# Patient Record
Sex: MALE | Race: WHITE | NOT HISPANIC OR LATINO | Employment: UNEMPLOYED | URBAN - METROPOLITAN AREA
[De-identification: names, ages, dates, MRNs, and addresses within clinical notes are randomized per-mention and may not be internally consistent; named-entity substitution may affect disease eponyms.]

---

## 2017-02-14 ENCOUNTER — HOSPITAL ENCOUNTER (EMERGENCY)
Facility: HOSPITAL | Age: 22
Discharge: HOME | End: 2017-02-14
Attending: EMERGENCY MEDICINE | Admitting: EMERGENCY MEDICINE

## 2017-02-14 ENCOUNTER — APPOINTMENT (OUTPATIENT)
Dept: GENERAL RADIOLOGY | Facility: HOSPITAL | Age: 22
End: 2017-02-14

## 2017-02-14 VITALS
OXYGEN SATURATION: 98 % | HEART RATE: 104 BPM | RESPIRATION RATE: 14 BRPM | BODY MASS INDEX: 18.61 KG/M2 | TEMPERATURE: 97.5 F | WEIGHT: 145 LBS | DIASTOLIC BLOOD PRESSURE: 95 MMHG | HEIGHT: 74 IN | SYSTOLIC BLOOD PRESSURE: 142 MMHG

## 2017-02-14 DIAGNOSIS — F15.10 METHAMPHETAMINE ABUSE (HCC): ICD-10-CM

## 2017-02-14 DIAGNOSIS — R09.1 PLEURISY: Primary | ICD-10-CM

## 2017-02-14 DIAGNOSIS — Z78.9 CAFFEINE USE: ICD-10-CM

## 2017-02-14 LAB
ALBUMIN SERPL-MCNC: 4.4 G/DL (ref 3.5–5.2)
ALBUMIN/GLOB SERPL: 1.7 G/DL
ALP SERPL-CCNC: 86 U/L (ref 40–129)
ALT SERPL W P-5'-P-CCNC: 17 U/L (ref 5–41)
AMPHET+METHAMPHET UR QL: POSITIVE
AMPHETAMINES UR QL: POSITIVE
ANION GAP SERPL CALCULATED.3IONS-SCNC: 11.6 MMOL/L
AST SERPL-CCNC: 22 U/L (ref 5–40)
BARBITURATES UR QL SCN: NEGATIVE
BASOPHILS # BLD AUTO: 0.04 10*3/MM3 (ref 0–0.2)
BASOPHILS NFR BLD AUTO: 0.2 % (ref 0–2)
BENZODIAZ UR QL SCN: NEGATIVE
BILIRUB SERPL-MCNC: 0.3 MG/DL (ref 0.2–1.2)
BUN BLD-MCNC: 15 MG/DL (ref 6–20)
BUN/CREAT SERPL: 16.7 (ref 7–25)
BUPRENORPHINE SERPL-MCNC: NEGATIVE NG/ML
CALCIUM SPEC-SCNC: 9.5 MG/DL (ref 8.6–10.5)
CANNABINOIDS SERPL QL: NEGATIVE
CHLORIDE SERPL-SCNC: 98 MMOL/L (ref 98–107)
CO2 SERPL-SCNC: 24.4 MMOL/L (ref 22–29)
COCAINE UR QL: NEGATIVE
CREAT BLD-MCNC: 0.9 MG/DL (ref 0.76–1.27)
D DIMER PPP FEU-MCNC: <0.3 MCGFEU/ML (ref 0–0.46)
DEPRECATED RDW RBC AUTO: 40.5 FL (ref 37–54)
EOSINOPHIL # BLD AUTO: 0.06 10*3/MM3 (ref 0.1–0.3)
EOSINOPHIL NFR BLD AUTO: 0.4 % (ref 0–4)
ERYTHROCYTE [DISTWIDTH] IN BLOOD BY AUTOMATED COUNT: 12.7 % (ref 11.5–14.5)
GFR SERPL CREATININE-BSD FRML MDRD: 106 ML/MIN/1.73
GLOBULIN UR ELPH-MCNC: 2.6 GM/DL
GLUCOSE BLD-MCNC: 163 MG/DL (ref 65–99)
HCT VFR BLD AUTO: 41.3 % (ref 42–52)
HGB BLD-MCNC: 13.8 G/DL (ref 14–18)
IMM GRANULOCYTES # BLD: 0.08 10*3/MM3 (ref 0–0.03)
IMM GRANULOCYTES NFR BLD: 0.5 % (ref 0–0.5)
LYMPHOCYTES # BLD AUTO: 1.36 10*3/MM3 (ref 0.6–4.8)
LYMPHOCYTES NFR BLD AUTO: 8 % (ref 20–45)
MCH RBC QN AUTO: 29 PG (ref 27–31)
MCHC RBC AUTO-ENTMCNC: 33.4 G/DL (ref 31–37)
MCV RBC AUTO: 86.8 FL (ref 80–94)
METHADONE UR QL SCN: NEGATIVE
MONOCYTES # BLD AUTO: 1.27 10*3/MM3 (ref 0–1)
MONOCYTES NFR BLD AUTO: 7.5 % (ref 3–8)
NEUTROPHILS # BLD AUTO: 14.09 10*3/MM3 (ref 1.5–8.3)
NEUTROPHILS NFR BLD AUTO: 83.4 % (ref 45–70)
NRBC BLD MANUAL-RTO: 0 /100 WBC (ref 0–0)
OPIATES UR QL: NEGATIVE
OXYCODONE UR QL SCN: NEGATIVE
PCP UR QL SCN: NEGATIVE
PLATELET # BLD AUTO: 299 10*3/MM3 (ref 140–500)
PMV BLD AUTO: 8.9 FL (ref 7.4–10.4)
POTASSIUM BLD-SCNC: 4.1 MMOL/L (ref 3.5–5.2)
PROPOXYPH UR QL: NEGATIVE
PROT SERPL-MCNC: 7 G/DL (ref 6–8.5)
RBC # BLD AUTO: 4.76 10*6/MM3 (ref 4.7–6.1)
SODIUM BLD-SCNC: 134 MMOL/L (ref 136–145)
TRICYCLICS UR QL SCN: NEGATIVE
TSH SERPL DL<=0.05 MIU/L-ACNC: 2.34 MIU/ML (ref 0.27–4.2)
WBC NRBC COR # BLD: 16.9 10*3/MM3 (ref 4.8–10.8)

## 2017-02-14 PROCEDURE — 80050 GENERAL HEALTH PANEL: CPT | Performed by: EMERGENCY MEDICINE

## 2017-02-14 PROCEDURE — 80053 COMPREHEN METABOLIC PANEL: CPT

## 2017-02-14 PROCEDURE — 96374 THER/PROPH/DIAG INJ IV PUSH: CPT

## 2017-02-14 PROCEDURE — 71020 HC CHEST PA AND LATERAL: CPT

## 2017-02-14 PROCEDURE — 85379 FIBRIN DEGRADATION QUANT: CPT | Performed by: EMERGENCY MEDICINE

## 2017-02-14 PROCEDURE — 93010 ELECTROCARDIOGRAM REPORT: CPT | Performed by: INTERNAL MEDICINE

## 2017-02-14 PROCEDURE — 99284 EMERGENCY DEPT VISIT MOD MDM: CPT

## 2017-02-14 PROCEDURE — 25010000002 KETOROLAC TROMETHAMINE PER 15 MG: Performed by: EMERGENCY MEDICINE

## 2017-02-14 PROCEDURE — 80306 DRUG TEST PRSMV INSTRMNT: CPT | Performed by: EMERGENCY MEDICINE

## 2017-02-14 PROCEDURE — 93005 ELECTROCARDIOGRAM TRACING: CPT

## 2017-02-14 PROCEDURE — 84443 ASSAY THYROID STIM HORMONE: CPT

## 2017-02-14 PROCEDURE — 99284 EMERGENCY DEPT VISIT MOD MDM: CPT | Performed by: EMERGENCY MEDICINE

## 2017-02-14 PROCEDURE — 85025 COMPLETE CBC W/AUTO DIFF WBC: CPT

## 2017-02-14 RX ORDER — KETOROLAC TROMETHAMINE 30 MG/ML
30 INJECTION, SOLUTION INTRAMUSCULAR; INTRAVENOUS ONCE
Status: COMPLETED | OUTPATIENT
Start: 2017-02-14 | End: 2017-02-14

## 2017-02-14 RX ORDER — KETOROLAC TROMETHAMINE 10 MG/1
10 TABLET, FILM COATED ORAL EVERY 6 HOURS PRN
Qty: 20 TABLET | Refills: 0 | Status: SHIPPED | OUTPATIENT
Start: 2017-02-14

## 2017-02-14 RX ORDER — SODIUM CHLORIDE 0.9 % (FLUSH) 0.9 %
10 SYRINGE (ML) INJECTION AS NEEDED
Status: DISCONTINUED | OUTPATIENT
Start: 2017-02-14 | End: 2017-02-14 | Stop reason: HOSPADM

## 2017-02-14 RX ADMIN — KETOROLAC TROMETHAMINE 30 MG: 30 INJECTION, SOLUTION INTRAMUSCULAR at 18:25

## 2017-02-14 NOTE — ED PROVIDER NOTES
Subjective   History of Present Illness  History of Present Illness    Chief complaint: Chest pain    Location: Left side chest    Quality/Severity:  Sharp, 10 over 10 at its worst, 3/10 now    Timing/Onset/Duration: Acute onset at 9:30 AM after he woke up.    Modifying Factors: It hurts to take a deep breath.  Time seems to have made it a little better.  2 aspirin did not help.  He states he got minimal improvement with 3 sublingual nitroglycerin.    Associated Symptoms: She denies any headache.  No fever chills or cough.  No sore throat earache or nasal congestion.  The pain radiates up to the left side of his neck.  The patient did get sweaty.  The pain is sharp.  It hurts to take a deep breath.  The patient denies any abdominal pain.  No diarrhea or burning when he urinates.      Narrative: This 21-year-old white male presents with sharp left-sided chest pain and shortness of breath that started at 9:30 AM.  It started after he woke up.  He states that 1:30 PM it got a lot worse.  He got sweaty.  The pain is on the left side radiating to left side of his neck.  It is 10 over 10 at worst, 3/10 now.  It hurts to take a deep breath.  The patient was given 3 sublingual nitroglycerin by EMS with mild improvement.  The patient took 2 aspirin at home with no relief.  The patient states she's never had symptoms like this before.  He does smoke.  There is no history of coronary artery disease.  Patient does have a family history of coronary artery disease.  Patient smokes one half pack per day.  He is not diabetic hypertensive or dyslipidemic.  The patient has never had a blood clot in his leg or lung.  The patient has had no recent long trips or surgeries.  She does not have any bleeding or clotting problems.  The patient does consume a lot of caffeine, several Mountain Dew's and venom drinks.  He states he has had no caffeine today.    PCP:  No Known Provider      Review of Systems   Constitutional: Negative for chills  and fever.   HENT: Negative for ear pain and sore throat.    Eyes: Negative for discharge and redness.   Respiratory: Positive for shortness of breath. Negative for cough, chest tightness, wheezing and stridor.    Cardiovascular: Positive for chest pain. Negative for palpitations and leg swelling.   Gastrointestinal: Negative for abdominal pain, blood in stool, constipation, diarrhea, nausea and vomiting.   Genitourinary: Negative for decreased urine volume, dysuria, flank pain, frequency, hematuria and urgency.   Musculoskeletal: Negative for arthralgias, back pain, neck pain and neck stiffness.   Skin: Negative for color change, pallor, rash and wound.   Neurological: Negative for dizziness, speech difficulty, weakness, light-headedness, numbness and headaches.   Hematological: Negative for adenopathy.   Psychiatric/Behavioral: Negative.  Negative for agitation and confusion.        Medication List      Notice     You have not been prescribed any medications.        Past Medical History   Diagnosis Date   • Leukemia        No Known Allergies    Past Surgical History   Procedure Laterality Date   • Venous access device (port) insertion and removal         History reviewed. No pertinent family history.    Social History     Social History   • Marital status: Single     Spouse name: N/A   • Number of children: N/A   • Years of education: N/A     Social History Main Topics   • Smoking status: Current Every Day Smoker     Packs/day: 2.00     Types: Cigarettes   • Smokeless tobacco: None   • Alcohol use No   • Drug use: No   • Sexual activity: Not Asked     Other Topics Concern   • None     Social History Narrative   • None           Objective   Physical Exam   Constitutional: He is oriented to person, place, and time. He appears well-developed and well-nourished. No distress.   ED Triage Vitals:  Temp: 97.5 °F (36.4 °C) (02/14/17 1610)  Heart Rate: 96 (02/14/17 1610)  Resp: 16 (02/14/17 1610)  BP: 141/94 (02/14/17  1610)  SpO2: 100 % (02/14/17 1610)  Temp src: Oral (02/14/17 1610)  Heart Rate Source: n/a  Patient Position: n/a  BP Location: n/a  FiO2 (%): n/a    The patient's vitals were reviewed by me.  Unless otherwise noted they are within normal limits.     HENT:   Head: Normocephalic and atraumatic.   Right Ear: External ear normal.   Left Ear: External ear normal.   Nose: Nose normal.   Mouth/Throat: Oropharynx is clear and moist.   Eyes: Conjunctivae and EOM are normal. Pupils are equal, round, and reactive to light. Right eye exhibits no discharge. Left eye exhibits no discharge. No scleral icterus.   Neck: Normal range of motion. Neck supple. No JVD present. No tracheal deviation present. No thyromegaly present.   Cardiovascular: Normal rate, regular rhythm, normal heart sounds and intact distal pulses.  Exam reveals no gallop and no friction rub.    No murmur heard.  Pulmonary/Chest: Effort normal and breath sounds normal. No stridor. No respiratory distress. He has no wheezes. He has no rales. He exhibits no tenderness.   Abdominal: Soft. Bowel sounds are normal. He exhibits no distension and no mass. There is no tenderness. There is no rebound and no guarding. No hernia.   Musculoskeletal: Normal range of motion. He exhibits no edema or deformity.   Lymphadenopathy:     He has no cervical adenopathy.   Neurological: He is alert and oriented to person, place, and time.   Skin: Skin is warm and dry. No rash noted. He is not diaphoretic. No erythema. No pallor.   Psychiatric: His behavior is normal.   Nursing note and vitals reviewed.      Procedures         ED Course  ED Course   Comment By Time   The laboratory values were reviewed by me.  The glucose is 163.  Sodium is 134.  The white blood cell count 16.9.  The hemoglobin is 13.8.  Hematocrit 41.  The neutrophil percentage 60%.  Laboratory values are otherwise unremarkable. Jas Owusu MD 02/14 1805   The liver values were reviewed by me.  The  methamphetamine and amphetamine is positive.  The glucose is 163.  The sodium is 134.  The white count is 16.9.  Hemoglobin is 13.8 and hematocrit is 41.3.  Neutrophil percentage is 83%.  Laboratory values are otherwise are unremarkable. Jas Owusu MD 02/14 1955      5:48 PM, 02/14/17:  The EKG was obtained at 1611.  The EKG was interpreted by me at 1613.  EKG reveals a sinus tachycardia with a rate of 104.  The IL, QRS, QT intervals are normal.  Ears no ectopy.  There is a normal axis with left ventricular hypertrophy by voltage criteria.  There is nonspecific T-wave abnormalities in the lateral leads.  There is no acute ST elevation.            MDM  XR Chest 2 View    (Results Pending)     Labs Reviewed   COMPREHENSIVE METABOLIC PANEL - Abnormal; Notable for the following:        Result Value    Glucose 163 (*)     Sodium 134 (*)     All other components within normal limits   CBC WITH AUTO DIFFERENTIAL - Abnormal; Notable for the following:     WBC 16.90 (*)     Hemoglobin 13.8 (*)     Hematocrit 41.3 (*)     Neutrophil % 83.4 (*)     Lymphocyte % 8.0 (*)     Neutrophils, Absolute 14.09 (*)     Monocytes, Absolute 1.27 (*)     Eosinophils, Absolute 0.06 (*)     Immature Grans, Absolute 0.08 (*)     All other components within normal limits   D-DIMER, QUANTITATIVE - Normal    Narrative:     Can be elevated in, but is not diagnostic for deep vein thrombosis (DVT) or pulmonary embolis (PE).  It is also elevated in other medical conditions.  Clinical correlation is required.  The negative cut-off value for the D-Dimer is 0.50 mcg FEU/mL for DVT and PE.   CBC AND DIFFERENTIAL    Narrative:     The following orders were created for panel order CBC & Differential.  Procedure                               Abnormality         Status                     ---------                               -----------         ------                     CBC Auto Differential[[id number]]         Abnormal            Final result                  Please view results for these tests on the individual orders.     7:56 PM, 02/14/17:  Reassessed.  His chest pain is resolved.  The vital signs were reviewed and are stable.    8:03 PM, 02/14/17:  The patient's diagnosis of pleurisy and methamphetamine use were discussed with him.  He was counseled to stop using methamphetamines.  He was also advised to decrease his caffeine intake.  Patient will be given a prescription for Toradol.  Be given a list of primary care providers to follow-up with within one week.  All the patient's questions were answered he will be discharged in good condition.    Final diagnoses:   None         ED Medications:  Medications   sodium chloride 0.9 % flush 10 mL (not administered)       New Medications:     Medication List      Notice     You have not been prescribed any medications.        Stopped Medications:     Medication List      Notice     You have not been prescribed any medications.          Final diagnoses:   Pleurisy   Methamphetamine abuse   Caffeine use            Jas Owusu MD  02/14/17 2007

## 2017-02-15 NOTE — DISCHARGE INSTRUCTIONS
Stop taking methamphetamines.  Call  physician from physician referral list for primary care provider to follow-up with within one week.  Decrease her caffeine intake.  Return to emergency department if you have increasing pain, shortness of breath, worse in any way at all.

## 2021-07-30 ENCOUNTER — HOSPITAL ENCOUNTER (OUTPATIENT)
Dept: GENERAL RADIOLOGY | Facility: HOSPITAL | Age: 26
Discharge: HOME | End: 2021-07-30
Admitting: NURSE PRACTITIONER

## 2021-07-30 ENCOUNTER — TRANSCRIBE ORDERS (OUTPATIENT)
Dept: GENERAL RADIOLOGY | Facility: HOSPITAL | Age: 26
End: 2021-07-30

## 2021-07-30 DIAGNOSIS — M54.50 LOW BACK PAIN, UNSPECIFIED BACK PAIN LATERALITY, UNSPECIFIED CHRONICITY, UNSPECIFIED WHETHER SCIATICA PRESENT: ICD-10-CM

## 2021-07-30 DIAGNOSIS — M54.50 LOW BACK PAIN, UNSPECIFIED BACK PAIN LATERALITY, UNSPECIFIED CHRONICITY, UNSPECIFIED WHETHER SCIATICA PRESENT: Primary | ICD-10-CM

## 2021-07-30 PROCEDURE — 72100 X-RAY EXAM L-S SPINE 2/3 VWS: CPT

## 2021-08-20 ENCOUNTER — PREP FOR SURGERY (OUTPATIENT)
Dept: OTHER | Facility: HOSPITAL | Age: 26
End: 2021-08-20

## 2021-08-20 ENCOUNTER — OFFICE VISIT (OUTPATIENT)
Dept: OTOLARYNGOLOGY | Facility: CLINIC | Age: 26
End: 2021-08-20

## 2021-08-20 VITALS
DIASTOLIC BLOOD PRESSURE: 70 MMHG | HEIGHT: 74 IN | SYSTOLIC BLOOD PRESSURE: 112 MMHG | WEIGHT: 151.3 LBS | BODY MASS INDEX: 19.42 KG/M2

## 2021-08-20 DIAGNOSIS — J34.3 HYPERTROPHY OF BOTH INFERIOR NASAL TURBINATES: ICD-10-CM

## 2021-08-20 DIAGNOSIS — J34.2 NASAL SEPTAL DEVIATION: ICD-10-CM

## 2021-08-20 DIAGNOSIS — J34.89 NASAL OBSTRUCTION: Primary | ICD-10-CM

## 2021-08-20 DIAGNOSIS — J34.89 NASAL SEPTAL PERFORATION: ICD-10-CM

## 2021-08-20 DIAGNOSIS — J30.9 ALLERGIC RHINITIS, UNSPECIFIED SEASONALITY, UNSPECIFIED TRIGGER: ICD-10-CM

## 2021-08-20 PROCEDURE — 31231 NASAL ENDOSCOPY DX: CPT | Performed by: OTOLARYNGOLOGY

## 2021-08-20 PROCEDURE — 99204 OFFICE O/P NEW MOD 45 MIN: CPT | Performed by: OTOLARYNGOLOGY

## 2021-08-20 RX ORDER — GUAIFENESIN, PSEUDOEPHEDRINE HYDROCHLORIDE 600; 60 MG/1; MG/1
1 TABLET, EXTENDED RELEASE ORAL EVERY 12 HOURS
Qty: 90 TABLET | Refills: 3 | Status: SHIPPED | OUTPATIENT
Start: 2021-08-20

## 2021-08-20 RX ORDER — CEFAZOLIN SODIUM 2 G/50ML
2 SOLUTION INTRAVENOUS ONCE
Status: CANCELLED | OUTPATIENT
Start: 2021-08-20 | End: 2021-08-20

## 2021-08-20 RX ORDER — OXYMETAZOLINE HYDROCHLORIDE 0.05 G/100ML
2 SPRAY NASAL
Status: CANCELLED | OUTPATIENT
Start: 2021-08-20 | End: 2021-08-20

## 2021-08-20 RX ORDER — OXYMETAZOLINE HYDROCHLORIDE 0.05 G/100ML
2 SPRAY NASAL ONCE
Status: CANCELLED | OUTPATIENT
Start: 2021-08-20 | End: 2021-08-20

## 2021-08-20 RX ORDER — CETIRIZINE HYDROCHLORIDE 10 MG/1
10 TABLET ORAL
COMMUNITY
Start: 2021-07-29

## 2021-08-20 RX ORDER — AZELASTINE 1 MG/ML
1 SPRAY, METERED NASAL 2 TIMES DAILY
Qty: 1 EACH | Refills: 10 | Status: SHIPPED | OUTPATIENT
Start: 2021-08-20

## 2021-08-20 RX ORDER — FLUTICASONE PROPIONATE 50 MCG
1 SPRAY, SUSPENSION (ML) NASAL DAILY
COMMUNITY
Start: 2021-07-29

## 2021-08-20 NOTE — PROGRESS NOTES
ENT Office Consult Note     Date of Consult: 2021     Patient Name: Fransisco Deal  MRN: [id number]   : 1995     Referring Provider: Torito Schroeder*    Care Team: Patient Care Team:  Lissett Orozco APRN as PCP - General (Family Medicine)     Chief Complaint:    Chief Complaint   Patient presents with   • Advice Only     New Patient here for deviated septum       History of Present Illness: Fransisco Deal is a 26 y.o. male who presents today for evaluation of chronic nasal obstruction.  The nasal obstruction alternates and is especially bad at night which sometimes wakes him up gasping for air.  He has fractured the nose on numerous occasions but has not had any prior surgery.    Ben does have a history of allergic rhinitis being last tested as a child.  He was on no allergy shots.  He has tried numerous allergy sprays and decongestant just and some antihistamines without relief.  He does report 1 round of antibiotics in the last year for infection    Lastly Ben does have a prior history of drug addiction although he has been recovered now for 4 years.  He did at 1 point use inhaled hydrocodone or Percocet..      Subjective      Review of Systems:   Review of Systems   HENT: Positive for sinus pressure.    Allergic/Immunologic: Positive for environmental allergies.      I have reviewed and confirmed the accuracy of the ROS as documented by the MA/LPN/RN Sarthak Watts MD     Pertinent items are noted in HPI.     Past Medical History:   Past Medical History:   Diagnosis Date   • Asthma    • Back pain    • Leukemia (CMS/HCC)        Past Surgical History:   Past Surgical History:   Procedure Laterality Date   • VENOUS ACCESS DEVICE (PORT) INSERTION AND REMOVAL         Family History:   Family History   Problem Relation Age of Onset   • Diabetes Mother    • Cancer Father        Social History:   Social History     Socioeconomic History   • Marital status: Single      "Spouse name: Not on file   • Number of children: Not on file   • Years of education: Not on file   • Highest education level: Not on file   Tobacco Use   • Smoking status: Current Every Day Smoker     Packs/day: 1.00     Types: Cigarettes   • Smokeless tobacco: Never Used   Vaping Use   • Vaping Use: Never used   Substance and Sexual Activity   • Alcohol use: No   • Drug use: No       Medications:     Current Outpatient Medications:   •  cetirizine (zyrTEC) 10 MG tablet, Take 10 mg by mouth every night at bedtime., Disp: , Rfl:   •  fluticasone (FLONASE) 50 MCG/ACT nasal spray, 1 spray by Each Nare route Daily., Disp: , Rfl:   •  ProAir  (90 Base) MCG/ACT inhaler, Inhale See Admin Instructions. Inhale 1-2 puffs by mouth every 4-6 hours as needed, Disp: , Rfl:   •  ketorolac (TORADOL) 10 MG tablet, Take 1 tablet by mouth Every 6 (Six) Hours As Needed for moderate pain (4-6)., Disp: 20 tablet, Rfl: 0    Allergies:   No Known Allergies    Objective     Physical Exam:  Vital Signs:   Vitals:    08/20/21 0953   BP: 112/70   Weight: 68.6 kg (151 lb 4.8 oz)   Height: 188 cm (74\")     Body mass index is 19.43 kg/m².     General Appearance:  Alert, cooperative, in no acute distress   Head:  Normocephalic, without obvious abnormality, atraumatic   Eyes:          Conjunctivae and sclerae normal, PERRLA   Ears:   Tympanic membranes normal   Nose:  Tall narrow nose with an inferior septal deviation both right and left; no obvious polyps   Throat:  Tonsils size 2 no leukoplakia   Fiberoptic Exam:  Nasal and sinus endoscopy after topical anesthesia and consent; the septum is twisted off the maxillary spine both to the right and the left and superiorly obstructs partially the left middle meatus; there are no nasal polyps; no purulence to culture; moderate hypertrophy of the inferior turbinates   Neck:  No salivary masses or cervical adenopathy   Lungs:   Clear to auscultation,respirations regular, min and unlabored      " Heart:  Regular rhythm and normal rate, normal S1 and S2, no       murmur, no gallop, no rub, no click   Pulses: Pulses palpable and equal bilaterally   Skin: No bleeding, bruising or rash   Lymph nodes: No palpable adenopathy   Neurologic: Cranial nerves 2 - 12 grossly intact        Results Review:   Labs:     Imaging: XR Spine Lumbar 2 or 3 View    Result Date: 7/30/2021  Unremarkable lumbar spine series.  This report was finalized on 7/30/2021 3:17 PM by Taz Badillo MD.        Assessment / Plan      Assessment/Plan:   Diagnoses and all orders for this visit:    1. Nasal obstruction (Primary)    2. Nasal septal deviation    3. Hypertrophy of both inferior nasal turbinates    4. Allergic rhinitis, unspecified seasonality, unspecified trigger    5. Nasal septal perforation         Ben has longstanding nasal obstruction.  He has fractured his nose many times although the dorsum is relatively straight.  He does have a tall narrow nose and intranasally has a fairly severely twisted septum dislocated off the anterior nasal spine to the right and more posteriorly to the left with some impaction into the left middle meatus.  In addition there is allergic hypertrophy of turbinates.  He has not previously gotten improvement using various nasal sprays antihistamines and decongestants.  He clearly would benefit from an outpatient septoplasty and SMR of the inferior turbinates.      Follow Up:   No follow-ups on file.    Time:    Discussed plan of care in detail with patient today. Patient verbally understands and agrees. I have spent and counseled for approximately 45 minutes face to face, with greater than 50 % of the time counseling.     Sarthak Watts MD

## 2021-09-13 ENCOUNTER — TRANSCRIBE ORDERS (OUTPATIENT)
Dept: OTOLARYNGOLOGY | Facility: CLINIC | Age: 26
End: 2021-09-13

## 2021-09-13 DIAGNOSIS — Z41.9 SURGERY, ELECTIVE: ICD-10-CM

## 2021-09-13 DIAGNOSIS — Z20.822 COVID-19 RULED OUT: Primary | ICD-10-CM

## 2021-09-15 DIAGNOSIS — J34.2 NASAL SEPTAL DEVIATION: Primary | ICD-10-CM

## 2021-09-15 RX ORDER — CEPHALEXIN 500 MG/1
500 CAPSULE ORAL 3 TIMES DAILY
Qty: 30 CAPSULE | Refills: 0 | Status: SHIPPED | OUTPATIENT
Start: 2021-09-15 | End: 2021-10-21 | Stop reason: SDUPTHER

## 2021-09-15 RX ORDER — PYRAZINAMIDE 500 MG/1
1 TABLET ORAL EVERY 4 HOURS PRN
Qty: 10 TABLET | Refills: 1 | Status: SHIPPED | OUTPATIENT
Start: 2021-09-15 | End: 2021-10-21 | Stop reason: SDUPTHER

## 2021-10-20 ENCOUNTER — TRANSCRIBE ORDERS (OUTPATIENT)
Dept: OTOLARYNGOLOGY | Facility: CLINIC | Age: 26
End: 2021-10-20

## 2021-10-20 DIAGNOSIS — Z20.822 COVID-19 RULED OUT: Primary | ICD-10-CM

## 2021-10-21 DIAGNOSIS — J34.89 NASAL OBSTRUCTION: Primary | ICD-10-CM

## 2021-10-21 DIAGNOSIS — J34.2 NASAL SEPTAL DEVIATION: ICD-10-CM

## 2021-10-21 RX ORDER — ONDANSETRON 4 MG/1
4 TABLET, ORALLY DISINTEGRATING ORAL EVERY 8 HOURS PRN
Qty: 10 TABLET | Refills: 1 | Status: SHIPPED | OUTPATIENT
Start: 2021-10-21

## 2021-10-21 RX ORDER — CEPHALEXIN 500 MG/1
500 CAPSULE ORAL 3 TIMES DAILY
Qty: 15 CAPSULE | Refills: 0 | Status: SHIPPED | OUTPATIENT
Start: 2021-10-21

## 2021-10-21 RX ORDER — PYRAZINAMIDE 500 MG/1
1 TABLET ORAL EVERY 4 HOURS PRN
Qty: 10 TABLET | Refills: 1 | OUTPATIENT
Start: 2021-10-21 | End: 2022-06-20

## 2021-10-22 ENCOUNTER — OUTSIDE FACILITY SERVICE (OUTPATIENT)
Dept: OTOLARYNGOLOGY | Facility: CLINIC | Age: 26
End: 2021-10-22

## 2021-10-22 PROCEDURE — 30520 REPAIR OF NASAL SEPTUM: CPT | Performed by: OTOLARYNGOLOGY

## 2021-10-22 PROCEDURE — 30130 EXCISE INFERIOR TURBINATE: CPT | Performed by: OTOLARYNGOLOGY

## 2022-06-20 ENCOUNTER — APPOINTMENT (OUTPATIENT)
Dept: GENERAL RADIOLOGY | Facility: HOSPITAL | Age: 27
End: 2022-06-20

## 2022-06-20 ENCOUNTER — HOSPITAL ENCOUNTER (EMERGENCY)
Facility: HOSPITAL | Age: 27
Discharge: HOME | End: 2022-06-20
Attending: EMERGENCY MEDICINE | Admitting: EMERGENCY MEDICINE

## 2022-06-20 VITALS
HEART RATE: 101 BPM | HEIGHT: 73 IN | TEMPERATURE: 97.7 F | BODY MASS INDEX: 19.88 KG/M2 | WEIGHT: 150 LBS | DIASTOLIC BLOOD PRESSURE: 81 MMHG | OXYGEN SATURATION: 99 % | RESPIRATION RATE: 18 BRPM | SYSTOLIC BLOOD PRESSURE: 138 MMHG

## 2022-06-20 DIAGNOSIS — S92.502A CLOSED FRACTURE OF PHALANX OF LEFT FOURTH TOE, INITIAL ENCOUNTER: Primary | ICD-10-CM

## 2022-06-20 PROCEDURE — 99283 EMERGENCY DEPT VISIT LOW MDM: CPT

## 2022-06-20 PROCEDURE — 73630 X-RAY EXAM OF FOOT: CPT

## 2022-06-20 PROCEDURE — 99283 EMERGENCY DEPT VISIT LOW MDM: CPT | Performed by: EMERGENCY MEDICINE

## 2022-06-20 RX ORDER — HYDROCODONE BITARTRATE AND ACETAMINOPHEN 5; 325 MG/1; MG/1
1 TABLET ORAL EVERY 6 HOURS PRN
Qty: 20 TABLET | Refills: 0 | Status: SHIPPED | OUTPATIENT
Start: 2022-06-20

## 2022-06-20 NOTE — DISCHARGE INSTRUCTIONS
Take Norco as needed as directed for pain.  Take Colace as needed as directed for constipation if you are taking the [place] for pain.  Call Dr. Benavides for a follow-up appointment within 1 week.  Wear the postop shoe as directed.  Return to the emergency department if there is increased pain, numbness, tingling, weakness, change in color or temperature of the left fourth toe, worse in any way at all.

## 2022-06-20 NOTE — ED PROVIDER NOTES
Subjective   History of Present Illness  History of Present Illness    Chief complaint: Toe pain    Location: Left fourth toe and foot    Quality/Severity: Moderate, sharp    Timing/Onset/Duration: Started Saturday    Modifying Factors: Hurts to move, feels better to remain still    Associated Symptoms: No numbness, tingling, or weakness.  No change in color or temperature.    Narrative: This 27-year-old presents to the ER complaining of left fourth toe pain and foot pain.  He injured it 3 days ago while running.  Patient has a history of leukemia.  He has a history of asthma.  He is a smoker.  He does not drink alcohol.    PCP:Lissett Orozco APRN  Review of Systems   Musculoskeletal:        Right fourth toe pain   Skin: Negative for color change.   Neurological: Negative for weakness and numbness.        Medication List      ASK your doctor about these medications    acetaminophen-codeine 300-30 MG per tablet  Commonly known as: TYLENOL with CODEINE #3  Take 1 tablet by mouth Every 4 (Four) Hours As Needed for Moderate Pain . Take 1-2 tablets orally every 4-6 hours PRN pain     azelastine 0.1 % nasal spray  Commonly known as: ASTELIN  1 spray into the nostril(s) as directed by provider 2 (Two) Times a Day. Use in each nostril as directed     cephalexin 500 MG capsule  Commonly known as: KEFLEX  Take 1 capsule by mouth 3 (Three) Times a Day. Take 1 tablet three times daily.     cetirizine 10 MG tablet  Commonly known as: zyrTEC     fluticasone 50 MCG/ACT nasal spray  Commonly known as: FLONASE     ketorolac 10 MG tablet  Commonly known as: TORADOL  Take 1 tablet by mouth Every 6 (Six) Hours As Needed for moderate pain (4-6).     ondansetron ODT 4 MG disintegrating tablet  Commonly known as: ZOFRAN-ODT  Place 1 tablet on the tongue Every 8 (Eight) Hours As Needed for Nausea or Vomiting.     ProAir  (90 Base) MCG/ACT inhaler  Generic drug: albuterol sulfate HFA     pseudoephedrine-guaifenesin  MG  per 12 hr tablet  Commonly known as: Mucinex D  Take 1 tablet by mouth Every 12 (Twelve) Hours.            Past Medical History:   Diagnosis Date   • Asthma    • Back pain    • Leukemia (HCC)        No Known Allergies    Past Surgical History:   Procedure Laterality Date   • VENOUS ACCESS DEVICE (PORT) INSERTION AND REMOVAL         Family History   Problem Relation Age of Onset   • Diabetes Mother    • Cancer Father        Social History     Socioeconomic History   • Marital status: Single   Tobacco Use   • Smoking status: Current Every Day Smoker     Packs/day: 1.00     Types: Cigarettes   • Smokeless tobacco: Never Used   Vaping Use   • Vaping Use: Never used   Substance and Sexual Activity   • Alcohol use: No   • Drug use: No           Objective   Physical Exam  Vitals (The temperature is 97.7 °F, pulse 101, respirations 18, /81, room air pulse ox is 99%.) and nursing note reviewed.   Constitutional:       Appearance: Normal appearance.   Musculoskeletal:      Comments: There is tenderness upon palpation of the right fourth toe.  The capillary refills less than 2 seconds.  The sensation is intact.  There is a normal range of motion noted.  There is no joint laxity noted.  There is mild tenderness proximal to the fourth toe on the dorsal aspect of the foot.  There is no other tenderness or deformity.   Skin:     General: Skin is warm and dry.      Capillary Refill: Capillary refill takes less than 2 seconds.   Neurological:      Mental Status: He is alert and oriented to person, place, and time.      Sensory: No sensory deficit.      Motor: No weakness.         Procedures           ED Course      12:46 EDT, 06/20/22:  The patient's diagnosis of left fourth toe fracture was discussed with him.  The patient will have the third and the fourth toe buddy taped.  He will have a postop shoe applied.  The patient will be sent a prescription for Norco.  He should take Colace as needed as directed for constipation if  he is taking the Norco for pain.  The patient should call Dr. Monge today for a follow-up appointment within 1 week.  He should return to the emergency department if there is increased pain, numbness, tingling, weakness, change in color or temperature, worse in any way at all.  All the patient's questions were answered he will be discharged in good condition.    12:47 EDT, 06/20/22:  After karla taping of the toes they were assessed by me and noted to be in good position with the left lower extremity being neurovascularly intact.                                           MDM    Final diagnoses:   Closed fracture of phalanx of left fourth toe, initial encounter       ED Disposition  ED Disposition     None          No follow-up provider specified.       Medication List      No changes were made to your prescriptions during this visit.          Jas Owusu MD  06/20/22 5551